# Patient Record
Sex: MALE | Race: BLACK OR AFRICAN AMERICAN
[De-identification: names, ages, dates, MRNs, and addresses within clinical notes are randomized per-mention and may not be internally consistent; named-entity substitution may affect disease eponyms.]

---

## 2020-10-15 ENCOUNTER — HOSPITAL ENCOUNTER (EMERGENCY)
Dept: HOSPITAL 35 - ER | Age: 50
Discharge: HOME | End: 2020-10-15
Payer: COMMERCIAL

## 2020-10-15 VITALS — WEIGHT: 198 LBS | HEIGHT: 69 IN | BODY MASS INDEX: 29.33 KG/M2

## 2020-10-15 VITALS — DIASTOLIC BLOOD PRESSURE: 108 MMHG | SYSTOLIC BLOOD PRESSURE: 171 MMHG
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
## 2020-10-15 DIAGNOSIS — M25.562: ICD-10-CM
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
## 2020-10-15 DIAGNOSIS — R51.9: ICD-10-CM

## 2020-10-15 DIAGNOSIS — M54.5: Primary | ICD-10-CM

## 2021-12-21 ENCOUNTER — HOSPITAL ENCOUNTER (INPATIENT)
Dept: HOSPITAL 35 - ER | Age: 51
LOS: 6 days | Discharge: HOME | DRG: 177 | End: 2021-12-27
Attending: HOSPITALIST | Admitting: HOSPITALIST
Payer: COMMERCIAL

## 2021-12-21 VITALS — SYSTOLIC BLOOD PRESSURE: 172 MMHG | DIASTOLIC BLOOD PRESSURE: 108 MMHG

## 2021-12-21 VITALS — SYSTOLIC BLOOD PRESSURE: 161 MMHG | DIASTOLIC BLOOD PRESSURE: 108 MMHG

## 2021-12-21 VITALS — SYSTOLIC BLOOD PRESSURE: 162 MMHG | DIASTOLIC BLOOD PRESSURE: 104 MMHG

## 2021-12-21 VITALS — SYSTOLIC BLOOD PRESSURE: 176 MMHG | DIASTOLIC BLOOD PRESSURE: 118 MMHG

## 2021-12-21 VITALS — DIASTOLIC BLOOD PRESSURE: 120 MMHG | SYSTOLIC BLOOD PRESSURE: 195 MMHG

## 2021-12-21 VITALS — WEIGHT: 166 LBS | HEIGHT: 69.02 IN | BODY MASS INDEX: 24.59 KG/M2

## 2021-12-21 VITALS — DIASTOLIC BLOOD PRESSURE: 118 MMHG | SYSTOLIC BLOOD PRESSURE: 182 MMHG

## 2021-12-21 VITALS — SYSTOLIC BLOOD PRESSURE: 169 MMHG | DIASTOLIC BLOOD PRESSURE: 102 MMHG

## 2021-12-21 DIAGNOSIS — F12.90: ICD-10-CM

## 2021-12-21 DIAGNOSIS — D63.1: ICD-10-CM

## 2021-12-21 DIAGNOSIS — N17.9: ICD-10-CM

## 2021-12-21 DIAGNOSIS — I16.1: ICD-10-CM

## 2021-12-21 DIAGNOSIS — R80.9: ICD-10-CM

## 2021-12-21 DIAGNOSIS — E83.9: ICD-10-CM

## 2021-12-21 DIAGNOSIS — N18.6: ICD-10-CM

## 2021-12-21 DIAGNOSIS — Z79.899: ICD-10-CM

## 2021-12-21 DIAGNOSIS — U07.1: Primary | ICD-10-CM

## 2021-12-21 DIAGNOSIS — I12.0: ICD-10-CM

## 2021-12-21 LAB
ANION GAP SERPL CALC-SCNC: 18 MMOL/L (ref 7–16)
BACTERIA-REFLEX: (no result) /HPF
BASOPHILS NFR BLD AUTO: 1 % (ref 0–2)
BILIRUB UR-MCNC: NEGATIVE MG/DL
BUN SERPL-MCNC: 85 MG/DL (ref 7–18)
CALCIUM SERPL-MCNC: 7.8 MG/DL (ref 8.5–10.1)
CHLORIDE SERPL-SCNC: 110 MMOL/L (ref 98–107)
CO2 SERPL-SCNC: 15 MMOL/L (ref 21–32)
COLOR UR: YELLOW
CREAT SERPL-MCNC: 15.3 MG/DL (ref 0.7–1.3)
EOSINOPHIL NFR BLD: 2.3 % (ref 0–3)
ERYTHROCYTE [DISTWIDTH] IN BLOOD BY AUTOMATED COUNT: 15.2 % (ref 10.5–14.5)
FERRITIN SERPL-MCNC: 59 NG/ML (ref 26–388)
FOLATE SERPL-MCNC: 16.3 NG/ML (ref 8.6–58.9)
GLUCOSE SERPL-MCNC: 120 MG/DL (ref 74–106)
GRANULOCYTES NFR BLD MANUAL: 79.4 % (ref 36–66)
HCT VFR BLD CALC: 22.2 % (ref 42–52)
HGB BLD-MCNC: 7.1 GM/DL (ref 14–18)
IRON SERPL-MCNC: 27 UG/DL (ref 65–175)
KETONES UR STRIP-MCNC: NEGATIVE MG/DL
LYMPHOCYTES NFR BLD AUTO: 6.9 % (ref 24–44)
MCH RBC QN AUTO: 25.9 PG (ref 26–34)
MCHC RBC AUTO-ENTMCNC: 32.1 G/DL (ref 28–37)
MCV RBC: 80.7 FL (ref 80–100)
MONOCYTES NFR BLD: 10.4 % (ref 1–8)
NEUTROPHILS # BLD: 6.1 THOU/UL (ref 1.4–8.2)
PLATELET # BLD: 239 THOU/UL (ref 150–400)
POTASSIUM SERPL-SCNC: 4.5 MMOL/L (ref 3.5–5.1)
RBC # BLD AUTO: 2.75 MIL/UL (ref 4.5–6)
RBC # UR STRIP: (no result) /UL
RBC #/AREA URNS HPF: (no result) /HPF
SAO2 % BLD FROM PO2: 9 % (ref 20–39)
SODIUM SERPL-SCNC: 143 MMOL/L (ref 136–145)
SP GR UR STRIP: 1.02 (ref 1–1.03)
SQUAMOUS: (no result) /LPF (ref 0–3)
TIBC SERPL-MCNC: 293 UG/DL (ref 250–450)
URINE CLARITY: CLEAR
URINE GLUCOSE-RANDOM*: (no result)
URINE LEUKOCYTES-REFLEX: NEGATIVE
URINE NITRITE-REFLEX: NEGATIVE
URINE PROTEIN (DIPSTICK): (no result)
URINE WBC-REFLEX: (no result) /HPF (ref 0–5)
UROBILINOGEN UR STRIP-ACNC: 0.2 E.U./DL (ref 0.2–1)
VIT B12 SERPL-MCNC: 736 PG/ML (ref 193–986)
WBC # BLD AUTO: 7.7 THOU/UL (ref 4–11)

## 2021-12-21 PROCEDURE — 32100 EXPLORATION OF CHEST: CPT

## 2021-12-21 PROCEDURE — 10080 I&D PILONIDAL CYST SIMPLE: CPT

## 2021-12-21 PROCEDURE — 10879: CPT

## 2021-12-21 NOTE — EKG
[address]  [address]
Phone:  [phone]                    ELECTROCARDIOGRAM REPORT      
_______________________________________________________________________________
 
Name:       MIRIAN STOVER              Room #:                     PRE ER  
M.R.#:      [id number]     Account #:      [id number]  
Admission:              Attend Phys:                          
Discharge:              Date of Birth:  [birth year]  
                                                          Report #: [id number]
   [id number]
_______________________________________________________________________________
                         [facility] ED
                                       
Test Date:    2021               Test Time:    07:30:45
Pat Name:     MIRIAN STOVER           Department:   
Patient ID:   [id number]            Room:          
Gender:                               Technician:   TARIQ
:          1970               Requested By: Karl Vicente
Order Number: [id number] MD:   Gus Beltran
                                 Measurements
Intervals                              Axis          
Rate:         92                       P:            61
CT:           157                      QRS:          30
QRSD:         88                       T:            61
QT:           347                                    
QTc:          430                                    
                           Interpretive Statements
Sinus rhythm
Probable left atrial enlargement
Baseline wander in lead(s) II,III,aVL,aVF,V4,V5
No previous ECG available for comparison
Electronically Signed On 2021 7:47:21 CST by Gus Beltran
[email or web address]
 
 
 
 
 
 
 
 
 
 
 
 
 
 
 
 
 
 
 
 
 
  <ELECTRONICALLY SIGNED>
   By: Gus Beltran MD, [facility]   
  21     0747
D: 21 0730                           _____________________________________
T: 21 0730                           Gus Beltran MD, FACC     /EPI

## 2021-12-21 NOTE — NUR
PT ADMITTED FROM ER FOR POSITIVE COVID AT 1200PM, PT IS A&OX4, PT IS ON ROOM
AIR, PT'S BP HAS IMPROVED , PT'S VS ARE STABLE, PT DENIES SOB AND PAIN BY THIS
TIME, PT HAS RENAL DR CONSULT,NEW ORDER 24HR URINE CHECK AND PT WILL KEEP NPO
AFTER [place] FOR POSSIBLE DIALYSIS CATHETER PLACEMENT TOMORROW.

## 2021-12-22 VITALS — DIASTOLIC BLOOD PRESSURE: 97 MMHG | SYSTOLIC BLOOD PRESSURE: 152 MMHG

## 2021-12-22 VITALS — SYSTOLIC BLOOD PRESSURE: 162 MMHG | DIASTOLIC BLOOD PRESSURE: 92 MMHG

## 2021-12-22 VITALS — DIASTOLIC BLOOD PRESSURE: 63 MMHG | SYSTOLIC BLOOD PRESSURE: 138 MMHG

## 2021-12-22 VITALS — SYSTOLIC BLOOD PRESSURE: 153 MMHG | DIASTOLIC BLOOD PRESSURE: 99 MMHG

## 2021-12-22 VITALS — SYSTOLIC BLOOD PRESSURE: 158 MMHG | DIASTOLIC BLOOD PRESSURE: 97 MMHG

## 2021-12-22 VITALS — DIASTOLIC BLOOD PRESSURE: 127 MMHG | SYSTOLIC BLOOD PRESSURE: 142 MMHG

## 2021-12-22 LAB
ALBUMIN SERPL-MCNC: 3 G/DL (ref 3.4–5)
ANION GAP SERPL CALC-SCNC: 20 MMOL/L (ref 7–16)
APTT BLD: 35.2 SECONDS (ref 24.5–32.8)
BUN SERPL-MCNC: 88 MG/DL (ref 7–18)
CALCIUM SERPL-MCNC: 7.3 MG/DL (ref 8.5–10.1)
CALCIUM SERPL-MCNC: 7.6 MG/DL (ref 8.5–10.1)
CHLORIDE SERPL-SCNC: 108 MMOL/L (ref 98–107)
CO2 SERPL-SCNC: 12 MMOL/L (ref 21–32)
CREAT SERPL-MCNC: 14.8 MG/DL (ref 0.7–1.3)
CREAT SERPL-MCNC: 15.6 MG/DL (ref 0.7–1.3)
ERYTHROCYTE [DISTWIDTH] IN BLOOD BY AUTOMATED COUNT: 14.8 % (ref 10.5–14.5)
EST. AVERAGE GLUCOSE BLD GHB EST-MCNC: 105 MG/DL
GLUCOSE SERPL-MCNC: 121 MG/DL (ref 74–106)
GLYCOHEMOGLOBIN (HGB A1C): 5.3 % (ref 4.8–5.6)
HCT VFR BLD CALC: 21.8 % (ref 42–52)
HGB BLD-MCNC: 7.1 GM/DL (ref 14–18)
INR PPP: 0.97
MCH RBC QN AUTO: 25.9 PG (ref 26–34)
MCHC RBC AUTO-ENTMCNC: 32.7 G/DL (ref 28–37)
MCV RBC: 79 FL (ref 80–100)
PHOSPHATE SERPL-MCNC: 7.3 MG/DL (ref 2.5–4.9)
PHOSPHATE SERPL-MCNC: 7.7 MG/DL (ref 2.6–4.7)
PLATELET # BLD: 211 THOU/UL (ref 150–400)
POTASSIUM SERPL-SCNC: 4.6 MMOL/L (ref 3.5–5.1)
PROT/CREAT UR-RTO: 4.6
PROTHROMBIN TIME: 10.6 SECONDS (ref 10.5–12.1)
RBC # BLD AUTO: 2.76 MIL/UL (ref 4.5–6)
SODIUM SERPL-SCNC: 140 MMOL/L (ref 136–145)
URINE CREATININE-RANDOM*: 77.4 MG/DL
URINE PROTEIN-RANDOM*: 352.8 MG/DL (ref ?–11.9)
WBC # BLD AUTO: 7.4 THOU/UL (ref 4–11)

## 2021-12-22 PROCEDURE — 02HV33Z INSERTION OF INFUSION DEVICE INTO SUPERIOR VENA CAVA, PERCUTANEOUS APPROACH: ICD-10-PCS | Performed by: HOSPITALIST

## 2021-12-22 PROCEDURE — 0JH63XZ INSERTION OF TUNNELED VASCULAR ACCESS DEVICE INTO CHEST SUBCUTANEOUS TISSUE AND FASCIA, PERCUTANEOUS APPROACH: ICD-10-PCS

## 2021-12-22 PROCEDURE — B548ZZA ULTRASONOGRAPHY OF SUPERIOR VENA CAVA, GUIDANCE: ICD-10-PCS

## 2021-12-22 PROCEDURE — B5181ZA FLUOROSCOPY OF SUPERIOR VENA CAVA USING LOW OSMOLAR CONTRAST, GUIDANCE: ICD-10-PCS

## 2021-12-22 PROCEDURE — 5A1D70Z PERFORMANCE OF URINARY FILTRATION, INTERMITTENT, LESS THAN 6 HOURS PER DAY: ICD-10-PCS

## 2021-12-22 NOTE — NUR
INITIAL ASSESSMENT:
YANY reviewed chart and spoke with nursing and attending physician. Pt was
admitted from home due to renal failure. Pt placed in Enhanced Isolation due
to COVID. Pt has not received a COVID vaccination.  Pt with hx HTN/CKD stage
5. Pt is afebrile and on room air. Renal consulted. Pt to have tunneled
catheter placed today and dialysis to be started. Pt listed as being
uninsured. SW spoke with pt via phone. Introduced role of SW. Pt is
alert/orientated x 4. Pt reports he is moving to [place] from [place]. Pt lives
with his wife. Pt states he currently has [place] Medicaid, as he is
disabled. Plan is for pt to remain in [place]. SW discussed need to establish
outpatient dialysis. Pt verbalized understanding. SW notified pt that [facility] may be reaching out with him to assist with [place] Medicaid application. SW
discussed options of dialysis companies. No preference voiced. YANY faxed
referral to [facility] and left voice message for Trish. SW sent message to [facility] to request assistance with Medicaid ifeoma. YANY updated UR regarding pt's
[place] Medicaid. YANY is following to assist as needed with discharge
planning.

## 2021-12-22 NOTE — NUR
PT MAKING PROGRESS TOWARDS GOALS.  REPORTING MILD HEADACHE OVERNIGHT.  REPORTS
DECREASE IN HEADACHE WHICH CORRRESPONDS WITH LOWERED BLOOD PRESSURE.  DENIED
NEED FOR ANY PAIN MEDICATION.

## 2021-12-22 NOTE — NUR
RN ASSUMED PT'S CARE AT 0700AM, PT IS A&OX4, PT IS ON ROOM AIR , PT 'S VS ARE
STABLE , PT DENIES PAIN AND SOB BY THIS TIME, PT HAS NEW DAILYSIS TUNNELED
CATHETER AT R IJ, PT HAS HEMODILYSIS TO START ABOUT 1700PM, PT IS TOLERAIVE,

## 2021-12-23 VITALS — SYSTOLIC BLOOD PRESSURE: 133 MMHG | DIASTOLIC BLOOD PRESSURE: 95 MMHG

## 2021-12-23 VITALS — DIASTOLIC BLOOD PRESSURE: 81 MMHG | SYSTOLIC BLOOD PRESSURE: 127 MMHG

## 2021-12-23 VITALS — SYSTOLIC BLOOD PRESSURE: 156 MMHG | DIASTOLIC BLOOD PRESSURE: 107 MMHG

## 2021-12-23 VITALS — DIASTOLIC BLOOD PRESSURE: 90 MMHG | SYSTOLIC BLOOD PRESSURE: 139 MMHG

## 2021-12-23 VITALS — SYSTOLIC BLOOD PRESSURE: 176 MMHG | DIASTOLIC BLOOD PRESSURE: 116 MMHG

## 2021-12-23 VITALS — SYSTOLIC BLOOD PRESSURE: 149 MMHG | DIASTOLIC BLOOD PRESSURE: 85 MMHG

## 2021-12-23 LAB — PTH-INTACT SERPL-MCNC: 566 PG/ML (ref 15–65)

## 2021-12-23 PROCEDURE — 5A1D70Z PERFORMANCE OF URINARY FILTRATION, INTERMITTENT, LESS THAN 6 HOURS PER DAY: ICD-10-PCS | Performed by: HOSPITALIST

## 2021-12-23 NOTE — NUR
PT RESTING IN BED WATCHING TV. NO C/O HEADACHE. PT PROVIDED HS SNACK. NOT SOA.
RIJ INTACT. PIV INTACT. PT CALLS FOR ASSISTANCE, STEADY GAIT INDEPENDENT.

## 2021-12-23 NOTE — NUR
YANY reviewed chart and spoke with nursing and attending physician. Pt had
tunneled dialysis catheter placed yesterday and has started dialysis. YANY spoke
with pt via phone to see if he has his [place] Medicaid card with him. Pt
does not have his card. SW explained that his [employer] card is needed to assist
with arranging outpatient dialysis. Pt verbalized understanding. YANY spoke with
pt's wife via phone. Update provided. Pt's wife states she will check when she
gets home to see if his card is there, or if she can find his policy number.
Contact info for YANY provided. Pt does not have a [place] residence yet. YANY left
voice message for Trish at [facility] intake to see if they are able to bill [place]
Medicaid and assist with transferring pt's Medicaid to [place]. Case discussed with
ARNOLD. ARNOLD de oliveira contacted [facility]. Office is closed for the Holiday.  No weekend
discharge planned, as pt's insurance cannot be verified at this time. YANY is
following to assist as needed with discharge planning.

## 2021-12-23 NOTE — NUR
RN ASSUMED PT'S CARE AT 0700AM , PT IS A&OX4, PT HAS STARTED HIS DIALYSIS
YESTODAY, PT IS TOLERATIVE HIS DAILYSIS TODAY, PT IS TOLERATIVE DAILYSIS ,
PT'S BP AND HEADCHE HAVE IMPROVED, PT IS CONTINUING COVID ISOLATION, PT DENIES
SOB AT DAY SHIFT.

## 2021-12-23 NOTE — NUR
PT REPORTED "THIS IS THE BEST I'VE FELT IN A LONG WHILE" LAST NIGHT WHILE
EATING.  NO ISSURE REPORTED DURING FIRST DIALYSIS RUN.

## 2021-12-24 VITALS — SYSTOLIC BLOOD PRESSURE: 135 MMHG | DIASTOLIC BLOOD PRESSURE: 89 MMHG

## 2021-12-24 VITALS — DIASTOLIC BLOOD PRESSURE: 88 MMHG | SYSTOLIC BLOOD PRESSURE: 149 MMHG

## 2021-12-24 VITALS — SYSTOLIC BLOOD PRESSURE: 138 MMHG | DIASTOLIC BLOOD PRESSURE: 86 MMHG

## 2021-12-24 VITALS — DIASTOLIC BLOOD PRESSURE: 89 MMHG | SYSTOLIC BLOOD PRESSURE: 143 MMHG

## 2021-12-24 VITALS — SYSTOLIC BLOOD PRESSURE: 133 MMHG | DIASTOLIC BLOOD PRESSURE: 82 MMHG

## 2021-12-24 LAB
ERYTHROCYTE [DISTWIDTH] IN BLOOD BY AUTOMATED COUNT: 14.9 % (ref 10.5–14.5)
HCT VFR BLD CALC: 22.6 % (ref 42–52)
HGB BLD-MCNC: 7.4 GM/DL (ref 14–18)
MCH RBC QN AUTO: 26 PG (ref 26–34)
MCHC RBC AUTO-ENTMCNC: 32.8 G/DL (ref 28–37)
MCV RBC: 79.1 FL (ref 80–100)
PLATELET # BLD: 190 THOU/UL (ref 150–400)
RBC # BLD AUTO: 2.86 MIL/UL (ref 4.5–6)
WBC # BLD AUTO: 5.4 THOU/UL (ref 4–11)

## 2021-12-24 PROCEDURE — 5A1D70Z PERFORMANCE OF URINARY FILTRATION, INTERMITTENT, LESS THAN 6 HOURS PER DAY: ICD-10-PCS | Performed by: HOSPITALIST

## 2021-12-25 VITALS — DIASTOLIC BLOOD PRESSURE: 80 MMHG | SYSTOLIC BLOOD PRESSURE: 129 MMHG

## 2021-12-25 VITALS — SYSTOLIC BLOOD PRESSURE: 145 MMHG | DIASTOLIC BLOOD PRESSURE: 97 MMHG

## 2021-12-25 VITALS — SYSTOLIC BLOOD PRESSURE: 155 MMHG | DIASTOLIC BLOOD PRESSURE: 99 MMHG

## 2021-12-25 VITALS — DIASTOLIC BLOOD PRESSURE: 90 MMHG | SYSTOLIC BLOOD PRESSURE: 133 MMHG

## 2021-12-25 NOTE — NUR
RN ASSUMED PT'S CARE AT 0700AM, PT IS A&OX4, PT IS ON ROOM AIR. PT IS
CONTINUING DIALYSIS AND TREAT COVID MEDICATIONS, PT'S WEAKNESS HAS IMPROVED,
PT'S HIGH BP HAS IMPROVED , PT'S O2SAT AND VS ARE STABLE BY THIS TIME,PT
DEINES PAIN AND SOB BY THIS TIME.

## 2021-12-25 NOTE — NUR
PT WATCHING TV WHILE LAYING IN BED IN THE DARK. PT STATED PLANS FOR DC MONDAY.
PT REPORTED DIALYSIS CATHETER SITE TENDER, PRN PROVIDED. PT CALLS FOR ASSIST.
PT STATED HE DID NOT HAVE DIALYSIS TODAY.

## 2021-12-26 VITALS — DIASTOLIC BLOOD PRESSURE: 91 MMHG | SYSTOLIC BLOOD PRESSURE: 151 MMHG

## 2021-12-26 VITALS — DIASTOLIC BLOOD PRESSURE: 94 MMHG | SYSTOLIC BLOOD PRESSURE: 153 MMHG

## 2021-12-26 VITALS — DIASTOLIC BLOOD PRESSURE: 100 MMHG | SYSTOLIC BLOOD PRESSURE: 155 MMHG

## 2021-12-26 LAB
ANION GAP SERPL CALC-SCNC: 13 MMOL/L (ref 7–16)
BASOPHILS NFR BLD AUTO: 0.7 % (ref 0–2)
BUN SERPL-MCNC: 43 MG/DL (ref 7–18)
CALCIUM SERPL-MCNC: 8 MG/DL (ref 8.5–10.1)
CHLORIDE SERPL-SCNC: 101 MMOL/L (ref 98–107)
CO2 SERPL-SCNC: 24 MMOL/L (ref 21–32)
CREAT SERPL-MCNC: 9.7 MG/DL (ref 0.7–1.3)
EOSINOPHIL NFR BLD: 2.1 % (ref 0–3)
ERYTHROCYTE [DISTWIDTH] IN BLOOD BY AUTOMATED COUNT: 15 % (ref 10.5–14.5)
GLUCOSE SERPL-MCNC: 100 MG/DL (ref 74–106)
GRANULOCYTES NFR BLD MANUAL: 63.4 % (ref 36–66)
HCT VFR BLD CALC: 22.6 % (ref 42–52)
HGB BLD-MCNC: 7.3 GM/DL (ref 14–18)
LYMPHOCYTES NFR BLD AUTO: 21.3 % (ref 24–44)
Lab: 21 MG/24 HR (ref 390–1425)
Lab: 29.9 MG/DL
MAGNESIUM SERPL-MCNC: 1.9 MG/DL (ref 1.8–2.4)
MCH RBC QN AUTO: 26 PG (ref 26–34)
MCHC RBC AUTO-ENTMCNC: 32.5 G/DL (ref 28–37)
MCV RBC: 79.8 FL (ref 80–100)
MONOCYTES NFR BLD: 12.5 % (ref 1–8)
NEUTROPHILS # BLD: 4.1 THOU/UL (ref 1.4–8.2)
PLATELET # BLD: 196 THOU/UL (ref 150–400)
POTASSIUM SERPL-SCNC: 3.8 MMOL/L (ref 3.5–5.1)
RBC # BLD AUTO: 2.83 MIL/UL (ref 4.5–6)
SODIUM SERPL-SCNC: 138 MMOL/L (ref 136–145)
WBC # BLD AUTO: 6.4 THOU/UL (ref 4–11)

## 2021-12-26 NOTE — NUR
RN ASSUMED PT'S CARE AT 0700AM, PT IS A&OX4,PT IS CONTINUING TREAT COVID
MEDICATIONS, PT DENIES PAIN AND SOB, PT'S VS ARE STABLE AT DAY SHIFT, PT WILL
HAVE DIALYSIS TOMORROW.

## 2021-12-27 VITALS — DIASTOLIC BLOOD PRESSURE: 79 MMHG | SYSTOLIC BLOOD PRESSURE: 138 MMHG

## 2021-12-27 VITALS — SYSTOLIC BLOOD PRESSURE: 160 MMHG | DIASTOLIC BLOOD PRESSURE: 100 MMHG

## 2021-12-27 VITALS — DIASTOLIC BLOOD PRESSURE: 100 MMHG | SYSTOLIC BLOOD PRESSURE: 160 MMHG

## 2021-12-27 VITALS — DIASTOLIC BLOOD PRESSURE: 116 MMHG | SYSTOLIC BLOOD PRESSURE: 162 MMHG

## 2021-12-27 VITALS — DIASTOLIC BLOOD PRESSURE: 100 MMHG | SYSTOLIC BLOOD PRESSURE: 156 MMHG

## 2021-12-27 LAB
ALBUMIN SERPL-MCNC: 2.9 G/DL (ref 3.4–5)
ANION GAP SERPL CALC-SCNC: 14 MMOL/L (ref 7–16)
BUN SERPL-MCNC: 53 MG/DL (ref 7–18)
CALCIUM SERPL-MCNC: 8.4 MG/DL (ref 8.5–10.1)
CHLORIDE SERPL-SCNC: 101 MMOL/L (ref 98–107)
CO2 SERPL-SCNC: 22 MMOL/L (ref 21–32)
CREAT SERPL-MCNC: 12 MG/DL (ref 0.7–1.3)
GLUCOSE SERPL-MCNC: 93 MG/DL (ref 74–106)
PHOSPHATE SERPL-MCNC: 6.4 MG/DL (ref 2.5–4.9)
POTASSIUM SERPL-SCNC: 3.9 MMOL/L (ref 3.5–5.1)
SODIUM SERPL-SCNC: 137 MMOL/L (ref 136–145)

## 2021-12-27 PROCEDURE — 5A1D70Z PERFORMANCE OF URINARY FILTRATION, INTERMITTENT, LESS THAN 6 HOURS PER DAY: ICD-10-PCS | Performed by: HOSPITALIST

## 2021-12-27 NOTE — NUR
YANY notified by [employer] that pt does have Medicare parts A&B and then Medicaid
of [place] is secondary. YANY faxed updated face sheet and clinical info to [facility]
intake. Left voice message for Trish to provide update. Pt to have dialysis
today and is medically stable for discharge. Pt remains in Enhanced Isolation
due to COVID. YANY is awaiting call back from [facility] to assist with arranging
outpatient dialysis. YANY is following to assist as needed with discharge
planning.

## 2021-12-27 NOTE — NUR
RN ASSUMED PT'S CARE AT 0700-1830PM, PT IS A&OX4, PT 'S BP AND RENAL FUNCTION
HAVE IMPROVED , PT DENIES PAIN AND SOB, PT HAS FINISHED HIS DIALYSIS TODAY, PT
IS TOLERATIVE DAILYSIS, RN HAS CALLED HOSPITAL  TO REPORT PT'S TEMP 100.2 F
(O), AFTER RN GIVING PO TYLENOL 650MG PO,  PT'S TEMP IS 99.6 F (O) AT 1800PM,
HOSPITAL  OK FOR PT TO DC TO HOME, PT UNDERSTANDS DC TEACHING WELL , PT'S
FAMILY  PT TO HOME AT 1830PM.

## 2021-12-28 LAB
COLLECT DURATION TIME UR: 24 HOURS
SPECIMEN VOL 24H UR: 70 ML

## 2021-12-28 NOTE — NUR
SW faxed discharge ppwk and Hep B Surface Antigen results to [facility].
Received fax confirmation. No additional SW needs identified at this time. YANY
is following to assist as needed with discharge planning.

## 2021-12-29 NOTE — NUR
YANY received call from Trish at [facility] requesting pt's dialysis flow sheets to be
faxed to her. SW printed from scanned images and faxed to [facility].
Received fax confirmation. No additional SW needs identified at this time. YANY
is following to assist as needed with discharge planning.